# Patient Record
(demographics unavailable — no encounter records)

---

## 2021-06-28 NOTE — RAD
EXAM: Right hip, 2 views; right knee, 3 views.



HISTORY: Pain.



COMPARISON: None.



FINDINGS: 



Right hip: 2 views of the right hip are obtained. There is no fracture, dislocation or subluxation. T
here is a suspected chronic fragmented osteophyte along the superior acetabulum. There is degenerativ
e change at the lumbosacral junction.



Right knee: 3 views of the right knee are obtained. There is no fracture, dislocation or subluxation.
 There is no joint effusion.



IMPRESSION: No acute osseous finding.



Electronically signed by: Leonora Sawant MD (6/28/2021 4:55 PM) [id number]

## 2021-06-28 NOTE — RAD
EXAM: Right hip, 2 views; right knee, 3 views.



HISTORY: Pain.



COMPARISON: None.



FINDINGS: 



Right hip: 2 views of the right hip are obtained. There is no fracture, dislocation or subluxation. T
here is a suspected chronic fragmented osteophyte along the superior acetabulum. There is degenerativ
e change at the lumbosacral junction.



Right knee: 3 views of the right knee are obtained. There is no fracture, dislocation or subluxation.
 There is no joint effusion.



IMPRESSION: No acute osseous finding.



Electronically signed by: Leonora Sawant MD (6/28/2021 4:55 PM) [id number]

## 2021-10-04 NOTE — RAD
EXAM: Bilateral screening mammogram.



HISTORY: 68-year-old female presents for screening mammography.



TECHNIQUE: Full-field digital craniocaudal and mediolateral oblique views of both breasts are obtaine
d for evaluation. Computer aided detection was applied.



COMPARISON: 8/8/2016



BREAST PARENCHYMAL DENSITY: Level D - Extremely dense.



FINDINGS: There is no new suspicious mass, microcalcification or region of architectural distortion. 
There are benign calcifications within both breasts.



IMPRESSION: BI-RADS Category 2: Benign finding(s). 



RECOMMENDATION: Annual mammography is recommended.



If your mammogram demonstrates that you have dense breast tissue, which could hide abnormalities, and
 if you have other risk factors for breast cancer that have been identified, you might benefit from s
upplemental screening tests that may be suggested by your ordering physician.  Dense breast tissue, i
n and of itself, is a relatively common condition.  This information is not provided to cause undue c
oncern, but rather to raise your awareness and to promote discussion with your physician regarding th
e presence of other risk factors, in addition to dense breast tissue. A report of your mammography re
sults will be sent to you and your physician.  You should contact your physician if you have any ques
tions or concerns regarding this report.  



Mammography is a sensitive method for finding small breast cancers, but it does not detect them all a
nd is not a substitute for careful clinical examination.  A negative mammogram does not negate a clin
ically suspicious finding and should not result in delay in biopsying a clinically suspicious abnorma
lity.



PQRS compliance statement -  Patient information was entered into a reminder system with a target due
 date for the next mammogram. 



"Our facility is accredited by the [employer] Mammography Program."



Electronically signed by: Leonora Sawant MD (10/4/2021 4:32 PM) [id number]

## 2022-01-06 NOTE — RAD
Study: XR CHEST 1V



Indication: Shortness of breath.



Comparison: 3/24/2021



Findings:



Bilobed nodular focus at the mid right lung measuring 2.2 cm transverse and is more conspicuous from 
the most recent comparison but unchanged from 11/2/2020 in keeping with a benign process. Basilar air
space infiltrates on the left more so than right. No layering effusion or pneumothorax. Hyperexpanded
 lungs and increased lung markings. Similar configuration of the cardiomediastinal silhouette and hil
a.



Impression:



Basilar infiltrates at the left more so than right lower lung suspicious for pneumonia in the appropr
iate clinical setting. Follow-up is needed to confirm resolution given background emphysema.



Electronically signed by: PAPO GODWIN MD (1/6/2022 10:36 PM) [facility]

## 2022-01-06 NOTE — EKG
[facility] [address], [place] [address]

Test Date:    2022               Test Time:    22:30:16

Pat Name:     RYNE BERTRAND           Department:   

Patient ID:   [id number]           Room:          

Gender:       F                        Technician:   

:          1952               Requested By: VENKATA MATIAS

Order Number: [id number]            Reading MD:   Abbe Contreras

                                 Measurements

Intervals                              Axis          

Rate:         83                       P:            75

MD:           138                      QRS:          97

QRSD:         110                      T:            39

QT:           392                                    

QTc:          467                                    

                           Interpretive Statements

SINUS RHYTHM

RIGHTWARD AXIS

LOW LIMB LEAD VOLTAGE

INCOMPLETE RIGHT BUNDLE BRANCH BLOCK

Electronically Signed On 2022 14:24:59 CST by Abbe Contreras

## 2022-01-06 NOTE — PHYS DOC
Past History


Past Medical History:  COPD, Hypertension


 (VENKATA MATIAS)


Past Medical History:  COPD


 (SARAH WESTBROOK MD)


Past Surgical History:  


 (VENKATA MATIAS)


Smoking:  Non-smoker


Alcohol Use:  Heavy


Drug Use:  None


 (VENKATA MATIAS)





Adult General


Chief Complaint


Chief Complaint:  SHORTNESS OF BREATH





HPI


HPI





Patient is a 69-year-old female patient with history of COPD, hypertension, 

presenting today complaining of shortness of breath for 3 days.  Patient states 

symptoms are worse on exertion.  She states she stopped smoking over 20 years 

ago.  She states she is employed at [employer] and could have been exposed to 

COVID-19.  Denies any fever.  Denies any chest pain.  Reports receiving [employer] 

COVID-vaccine sometime last year.  She states her symptoms do not feel like a 

COVID exacerbation.





She states she had a breathing treatment prior to coming to the ER


 (VENKATA MATIAS)





Review of Systems


Review of Systems





Constitutional: Denies fever or chills []


Eyes: Denies change in visual acuity, redness, or eye pain []


HENT: Denies nasal congestion or sore throat []


Respiratory: Reports shortness of breath


Cardiovascular: No additional information not addressed in HPI []


GI: Denies abdominal pain, nausea, vomiting, bloody stools or diarrhea []


: Denies dysuria or hematuria []


Musculoskeletal: Denies back pain or joint pain []


Integument: Denies rash or skin lesions []


Neurologic: Denies headache, focal weakness or sensory changes []








All other systems were reviewed and found to be within normal limits, except as 

documented in this note.


 (VENKATA MATIAS)





Allergies


Allergies





Allergies








Coded Allergies Type Severity Reaction Last Updated Verified


 


  Penicillins Allergy Unknown  18 Yes








 (VENKATA MATIAS)





Physical Exam


Physical Exam





Constitutional: Well developed, well nourished, no acute distress, non-toxic 

appearance. []


HENT: Normocephalic, atraumatic, bilateral external ears normal, oropharynx 

moist, no oral exudates, nose normal. []


Eyes: PERRLA, EOMI, conjunctiva normal, no discharge. [] 


Neck: Normal range of motion, no tenderness, supple, no stridor. [] 


Cardiovascular:Heart rate regular rhythm


Lungs & Thorax: Patient is short of breath, O2 sats 88% on room air


Abdomen: Bowel sounds normal, soft, no tenderness, no masses, no pulsatile 

masses. [] 


Skin: Warm, dry, no erythema, no rash. [] 


Back: No tenderness, no CVA tenderness. [] 


Extremities: No tenderness, no cyanosis, no clubbing, ROM intact, no edema. [] 


Neurologic: Alert and oriented X 3, normal motor function, normal sensory 

function, no focal deficits noted. []


Psychologic: Affect normal, judgement normal, mood normal. []


 (VENKATA MATIAS)





Current Patient Data


Vital Signs





                                   Vital Signs








  Date Time  Temp Pulse Resp B/P (MAP) Pulse Ox O2 Delivery O2 Flow Rate FiO2


 


22 21:03 98.5 98 20 141/72 (95) 93 Room Air  








 (VENKATA MATIAS)





EKG


EKG


[]


 (VENKATA MATIAS)


EKG


Interpretation of EKG shows sinus rhythm at 83 bpm.  Does have findings of low 

voltage and right axis deviation.  Has a mild right bundle branch block.  But no

 findings of acute STEMI of contralateral changes.  Time of EKG is 2230 hrs.


 (SARAH WESTBROOK MD)


Radiology/Procedures


Radiology/Procedures


[]


 (VENKATA MATIAS)


Radiology/Procedures


[facility] [address], [place] [address]


                                 [phone]


                                        


                                 IMAGING REPORT





                                     Signed





PATIENT: RYNE BERTRAND ACCOUNT: [id number]     MRN#: [id number]


: 1952           LOCATION: ER              AGE: 69


SEX: F                    EXAM DT: 22         ACCESSION#: [id number]


STATUS: REG ER            ORD. PHYSICIAN: VENKATA MATIAS


REASON: shortness of breath


PROCEDURE: PORTABLE CHEST 1V





Study: XR CHEST 1V





Indication: Shortness of breath.





Comparison: 3/24/2021





Findings:





Bilobed nodular focus at the mid right lung measuring 2.2 cm transverse and is 

more conspicuous from the most recent comparison but unchanged from 2020 in

 keeping with a benign process. Basilar airspace infiltrates on the left more so

 than right. No layering effusion or pneumothorax. Hyperexpanded lungs and 

increased lung markings. Similar configuration of the cardiomediastinal 

silhouette and doni.





Impression:





Basilar infiltrates at the left more so than right lower lung suspicious for 

pneumonia in the appropriate clinical setting. Follow-up is needed to confirm 

resolution given background emphysema.





Electronically signed by: PAPO GODWIN MD (2022 10:36 PM) [facility]














DICTATED AND SIGNED BY:     PAPO GODWIN MD


DATE:     22





CC: SARAH WESTBROOK MD; VENKATA MATIAS; JOSH HIGH ~MTH0 0





 (SARAH WESTBROOK MD)


Heart Score


C/O Chest Pain:  N/A


Risk Factors:


Risk Factors:  DM, Current or recent (<one month) smoker, HTN, HLP, family 

history of CAD, obesity.


Risk Scores:


Risk Factors:  DM, Current or recent (<one month) smoker, HTN, HLP, family 

history of CAD, obesity.


 (VENKATA MATIAS)





Course & Med Decision Making


Course & Med Decision Making


Pertinent Labs and Imaging studies reviewed. (See chart for details)





This is a 69-year-old female patient presented to the ED today with shortness of

 breath, symptoms began 3 days ago.  History of COPD.  She states the symptoms 

do not feel like her normal COPD exacerbation.





She states she received a breathing treatment at home prior to coming to the ED





Vitals on arrival to the ED temperature 98.5, heart rate 98, respirations 20 on 

room air, blood pressure 141/72, O2 sats 88 to 93% on room air. 





2225 Care tx to Dr. English


 (VENKATA MATIAS)


Course & Med Decision Making


COPD, Hypoxia,  -  See Notes Venkata Matias.   Prior shift change.   Pt. to take 

Zithromax to 50 mg a day.  Use MDI 2 puffs 4 times a day.  Follow-up pending 

Covid testing.  Return if any concerns.  Follow-up primary care.  Avoid smoking.

  Self isolate.





Impression:


1. COPD exacerbation


2.  Pneumonia


3.  Anemia hemoglobin 11.3


4. Malnutrition  - Albumin 2.8


5. Leukocytosis  12.0


 (SARAH WESTBROOK MD)


Dragon Disclaimer


Dragon Disclaimer


This electronic medical record was generated, in whole or in part, using a voice

 recognition dictation system.


 (VENKATA MATIAS)





Departure


Departure:


Referrals:  


JOSH HIGH (PCP)


Scripts


Azithromycin (ZITHROMAX) 250 Mg Tablet


250 MG PO DAILY for ANTI-BIOTIC for 5 Days, #5 TAB 0 Refills


   Prov: SARAH WESTBROOK MD         22





Dragon Disclaimer


This chart was dictated in whole or in part using Voice Recognition software in 

a busy, high-work load, and often noisy Emergency Department environment.  It 

may contain unintended and wholly unrecognized errors or omissions.


 (SARAH WESTBROOK MD)





Attending Signature


Attending Signature


I have participated in the care of this patient and I have reviewed and agree 

with all pertinent clinical information above including history, exam, and 

recommendations.





 (SARAH WESTBROOK MD)











VENKATA MATIAS             2022 21:12


SARAH WESTBROOK MD            2022 22:31